# Patient Record
Sex: FEMALE | Race: OTHER | HISPANIC OR LATINO | URBAN - METROPOLITAN AREA
[De-identification: names, ages, dates, MRNs, and addresses within clinical notes are randomized per-mention and may not be internally consistent; named-entity substitution may affect disease eponyms.]

---

## 2022-04-13 ENCOUNTER — EMERGENCY (EMERGENCY)
Facility: HOSPITAL | Age: 27
LOS: 1 days | Discharge: HOME | End: 2022-04-13
Attending: EMERGENCY MEDICINE
Payer: COMMERCIAL

## 2022-04-13 VITALS
HEART RATE: 76 BPM | TEMPERATURE: 98 F | OXYGEN SATURATION: 99 % | SYSTOLIC BLOOD PRESSURE: 111 MMHG | WEIGHT: 119.93 LBS | RESPIRATION RATE: 17 BRPM | HEIGHT: 65 IN | DIASTOLIC BLOOD PRESSURE: 75 MMHG

## 2022-04-13 PROCEDURE — 99283 EMERGENCY DEPT VISIT LOW MDM: CPT | Mod: 25

## 2022-04-13 PROCEDURE — 73090 X-RAY EXAM OF FOREARM: CPT | Mod: 26,LT

## 2022-04-13 PROCEDURE — 99284 EMERGENCY DEPT VISIT MOD MDM: CPT

## 2022-04-13 PROCEDURE — 73090 X-RAY EXAM OF FOREARM: CPT

## 2022-04-13 RX ORDER — IBUPROFEN 200 MG
600 TABLET ORAL ONCE
Refills: 0 | Status: COMPLETED | OUTPATIENT
Start: 2022-04-13 | End: 2022-04-13

## 2022-04-13 RX ADMIN — Medication 100 MILLIGRAM(S): at 11:57

## 2022-04-13 RX ADMIN — Medication 600 MILLIGRAM(S): at 11:57

## 2022-04-13 NOTE — ED STATDOCS - NSFOLLOWUPINSTRUCTIONS_ED_ALL_ED_FT
Cellulitis    Cellulitis is a skin infection caused by bacteria. This condition occurs most often in the arms and lower legs but can occur anywhere over the body. Symptoms include redness, swelling, warm skin, tenderness, and chills/fever. If you were prescribed an antibiotic medicine, take it as told by your health care provider. Do not stop taking the antibiotic even if you start to feel better.    SEEK IMMEDIATE MEDICAL CARE IF YOU HAVE ANY OF THE FOLLOWING SYMPTOMS: worsening fever, red streaks coming from affected area, vomiting or diarrhea, or dizziness/lightheadedness.     Please follow up with your doctor within 48 hours  Take doxycycline 100mg twice a day by mouth for ten days.

## 2022-04-13 NOTE — ED PROVIDER NOTE - NS ED MD DISPO DISCHARGE
I will SWITCH the dose or number of times a day I take the medications listed below when I get home from the hospital:  None Home

## 2022-04-13 NOTE — ED STATDOCS - PATIENT PORTAL LINK FT
You can access the FollowMyHealth Patient Portal offered by [facility] by registering at the following website: [email or web address]. By joining [employer]’s FollowMyHealth portal, you will also be able to view your health information using other applications (apps) compatible with our system.

## 2022-04-13 NOTE — ED PROVIDER NOTE - OBJECTIVE STATEMENT
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 PT reports injecting in this area and developing this wound in December. She reports being treated at both [facility] and [facility] for this infection over the past several months. She reports over the past few days the area has become more painful and she is having some chills.

## 2022-04-13 NOTE — ED STATDOCS - WR ORDER STATUS 1
There are no preventive care reminders to display for this patient.    Patient is up to date, no discussion needed.           Performed

## 2022-04-13 NOTE — ED PROVIDER NOTE - PHYSICAL EXAMINATION
Left forearm: + 2cm ulcerations to distal and mid forearm non draining, there is hypopigmenation of the surrounding skin with some swelling, no induration, NVI distally

## 2022-04-13 NOTE — ED PROVIDER NOTE - ATTENDING APP SHARED VISIT CONTRIBUTION OF CARE
26 y/o female h/o IVDA presents in police custody for a left forearm wound. PT reports injecting in this area and developing this wound in December.  Pe pos ulcer, but no surrounding erythema, pos granulation tissue, cw chronic would and healing plan oral abx

## 2022-04-13 NOTE — ED PROVIDER NOTE - NSFOLLOWUPINSTRUCTIONS_ED_ALL_ED_FT
Cellulitis is a skin infection caused by bacteria. This condition occurs most often in the arms and lower legs but can occur anywhere over the body. Symptoms include redness, swelling, warm skin, tenderness, and chills/fever. If you were prescribed an antibiotic medicine, take it as told by your health care provider. Do not stop taking the antibiotic even if you start to feel better.    SEEK IMMEDIATE MEDICAL CARE IF YOU HAVE ANY OF THE FOLLOWING SYMPTOMS: worsening fever, red streaks coming from affected area, vomiting or diarrhea, or dizziness/lightheadedness.     Please follow up with your doctor within 48 hours  Take doxycycline 100mg twice a day by mouth for ten days.

## 2022-04-13 NOTE — ED PROVIDER NOTE - NS ED ATTENDING STATEMENT MOD
This was a shared visit with the BEN. I reviewed and verified the documentation and independently performed the documented:

## 2022-04-13 NOTE — ED PROVIDER NOTE - CLINICAL SUMMARY MEDICAL DECISION MAKING FREE TEXT BOX
Chronic wound to left forearm unlikely active cellulitis however will start abx and outpatient follow up

## 2022-04-13 NOTE — ED PROVIDER NOTE - PATIENT PORTAL LINK FT
You can access the FollowMyHealth Patient Portal offered by [facility] by registering at the following website: [email or web address]. By joining [employer]’s FollowMyHealth portal, you will also be able to view your health information using other applications (apps) compatible with our system.

## 2022-04-13 NOTE — ED ADULT NURSE NOTE - OBJECTIVE STATEMENT
Pt is alert and oriented. Pt states that she was injecting IVD into her left FA and developed an infection on 12/23/21. Pts left FA is swollen and tender to the touch. Pt states that she has been having chills at home. 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 Pt has been on abx for the infection. Pt denies sob, chest pain, nausea, vomiting, and dizziness. Pt resp are even and unlabored, skin color jony for race. Pt updated on plan of care.
